# Patient Record
Sex: FEMALE | Race: WHITE | NOT HISPANIC OR LATINO | Employment: FULL TIME | URBAN - METROPOLITAN AREA
[De-identification: names, ages, dates, MRNs, and addresses within clinical notes are randomized per-mention and may not be internally consistent; named-entity substitution may affect disease eponyms.]

---

## 2017-10-16 ENCOUNTER — OFFICE VISIT (OUTPATIENT)
Dept: URGENT CARE | Facility: CLINIC | Age: 57
End: 2017-10-16
Payer: COMMERCIAL

## 2017-10-16 DIAGNOSIS — R30.0 DYSURIA: ICD-10-CM

## 2017-10-16 PROCEDURE — 99203 OFFICE O/P NEW LOW 30 MIN: CPT

## 2017-10-16 PROCEDURE — 81002 URINALYSIS NONAUTO W/O SCOPE: CPT

## 2017-10-17 ENCOUNTER — APPOINTMENT (OUTPATIENT)
Dept: LAB | Facility: HOSPITAL | Age: 57
End: 2017-10-17
Attending: NURSE PRACTITIONER
Payer: COMMERCIAL

## 2017-10-17 DIAGNOSIS — R30.0 DYSURIA: ICD-10-CM

## 2017-10-17 PROCEDURE — 87086 URINE CULTURE/COLONY COUNT: CPT

## 2017-10-18 LAB — BACTERIA UR CULT: ABNORMAL

## 2017-10-18 NOTE — PROGRESS NOTES
Assessment  1  Dysuria (788 1) (R30 0)    Plan   Dysuria    · Ciprofloxacin HCl - 500 MG Oral Tablet; Take 1 tablet twice daily   · Phenazopyridine HCl - 200 MG Oral Tablet; TAKE 1 TABLET 3 TIMES DAILY AFTER  MEALS AS NEEDED   · (1) URINE CULTURE; Source:Urine, Clean Catch; Status:Active; Requested  for:16Oct2017;     Urine Dip Non-Automated- POC; Status:Resulted - Requires Verification;   Done: 67UUN1779 12:00AM  Due:16Oct2018; Ordered; Ganesh Hsieh; Ordered By:Rena Nino;      Discussion/Summary  Discussion Summary:   Follow up with pcpin two days for results of urine culturemeds as discusseda lot of fluidsworsen go to ER, seek her ob/gyn or pcp  Medication Side Effects Reviewed: Possible side effects of new medications were reviewed with the patient/guardian today  Understands and agrees with treatment plan: The treatment plan was reviewed with the patient/guardian  The patient/guardian understands and agrees with the treatment plan   Counseling Documentation With Imm: The patient was counseled regarding instructions for management,-- patient and family education,-- importance of compliance with treatment  Follow Up Instructions: Follow Up with your Primary Care Provider in 1-2 days  If your symptoms worsen, go to the nearest [place] [address] Emergency Department  Chief Complaint  Chief Complaint Free Text Note Form: dysuria      History of Present Illness  HPI: Pain with urination, burning noted and for the past few days, and symptoms still present and used monistat last night  Took a 4 hour nap today which is not like pt and knows that something else may be going on  Review of Systems  Focused-Female:   Constitutional: No fever, no chills, feels well, no tiredness, no recent weight gain or loss  Gastrointestinal: no complaints of abdominal pain, no constipation, no nausea or diarrhea, no vomiting, no bloody stools  Genitourinary: as noted in HPI  Musculoskeletal: as noted in HPI     ROS Reviewed:   ROS reviewed  Allergies  1  Erythromycin TABS    Vitals  Signs   Recorded: [id number] 05:36PM   Temperature: 98 1 F  Heart Rate: 95  Respiration: 16  Systolic: 736  Diastolic: 66  Height: 5 ft 1 in  Weight: 163 lb   BMI Calculated: 30 8  BSA Calculated: 1 73  O2 Saturation: 98    Physical Exam    Constitutional   General appearance: No acute distress, well appearing and well nourished  Abdomen   Abdomen: Non-tender, no masses  Liver and spleen: No hepatomegaly or splenomegaly  Musculoskeletal   Gait and station: Normal     Psychiatric   Orientation to person, place, and time: Normal     Mood and affect: Normal     Additional Exam:   burning with urination, no blood noted, no discharge noted, no CVA tenderness  Signatures   Electronically signed by :  BUDDY Maria; Oct 16 2017  7:52PM EST                       (Author)    Electronically signed by : Panda Shea DO; Oct 17 2017 12:26PM EST                       (Co-author)

## 2017-12-11 ENCOUNTER — OFFICE VISIT (OUTPATIENT)
Dept: URGENT CARE | Facility: CLINIC | Age: 57
End: 2017-12-11
Payer: COMMERCIAL

## 2017-12-11 DIAGNOSIS — R30.0 DYSURIA: ICD-10-CM

## 2017-12-11 PROCEDURE — 99213 OFFICE O/P EST LOW 20 MIN: CPT

## 2017-12-11 PROCEDURE — 81002 URINALYSIS NONAUTO W/O SCOPE: CPT

## 2017-12-12 ENCOUNTER — APPOINTMENT (OUTPATIENT)
Dept: LAB | Facility: HOSPITAL | Age: 57
End: 2017-12-12
Attending: NURSE PRACTITIONER
Payer: COMMERCIAL

## 2017-12-12 DIAGNOSIS — R30.0 DYSURIA: ICD-10-CM

## 2017-12-12 PROCEDURE — 87086 URINE CULTURE/COLONY COUNT: CPT

## 2017-12-12 PROCEDURE — 87147 CULTURE TYPE IMMUNOLOGIC: CPT

## 2017-12-13 LAB
BILIRUB UR QL STRIP: NORMAL
CLARITY UR: NORMAL
COLOR UR: NORMAL
GLUCOSE (HISTORICAL): NORMAL
HGB UR QL STRIP.AUTO: NORMAL
KETONES UR STRIP-MCNC: NORMAL MG/DL
LEUKOCYTE ESTERASE UR QL STRIP: NORMAL
NITRITE UR QL STRIP: NORMAL
PH UR STRIP.AUTO: 5 [PH]
PROT UR STRIP-MCNC: NORMAL MG/DL
SP GR UR STRIP.AUTO: 1.01
UROBILINOGEN UR QL STRIP.AUTO: 0.5

## 2017-12-14 ENCOUNTER — GENERIC CONVERSION - ENCOUNTER (OUTPATIENT)
Dept: OTHER | Facility: OTHER | Age: 57
End: 2017-12-14

## 2017-12-14 LAB — BACTERIA UR CULT: NORMAL

## 2017-12-16 NOTE — PROGRESS NOTES
Assessment  1  Dysuria (788 1) (R30 0)    Plan   Dysuria    · Sulfamethoxazole-Trimethoprim 800-160 MG Oral Tablet; TAKE 1 TABLET TWICEDAILY UNTIL FINISHED   · Urine Dip Non-Automated- POC; Status:Complete;   Done: [id number] 05:00PM  (1) URINE CULTURE; Source:Urine, Clean Catch; Status:Resulted - Requires Verification;   Done: 10GAY9165 12:00AM Due:[id number];Ordered; Stat;   Mikala Paci; Ordered By:Lola Nino;    Discussion/Summary  Discussion Summary:   Follow up with pcptake meds as directedtake all meds, stay hydrated, drink plenty of waterdiscussed ER evaluationfollow up with urology if symptoms persistcall in two days for your urine culture results to see if treatment needs to be changed  eat yogurts, try taking probiotics  Medication Side Effects Reviewed: Possible side effects of new medications were reviewed with the patient/guardian today  Understands and agrees with treatment plan: The treatment plan was reviewed with the patient/guardian  The patient/guardian understands and agrees with the treatment plan   Counseling Documentation With Imm: The patient was counseled regarding instructions for management,-- patient and family education,-- importance of compliance with treatment  Follow Up Instructions: Follow Up with your Primary Care Provider in 1-2 days  If your symptoms worsen, go to the nearest [facility] Emergency Department  Chief Complaint  1  Urinary Frequency  Chief Complaint Free Text Note Form: Pt states she has a UTI  She has odor, burning on urination and dizziness  Has had this for a week or two  History of Present Illness  HPI: 57 yo female who is here today for dysuria for the past few days and worsening  She was here in October for similar complaints Wendy at that time her urine culture showed a large amount of lactobacilli  Patient has not followed up by her PCP OBGYN or urologist since her last visit   She denies having blood in urine, no nausea vomiting slight pain or fevers at this time   Hospital Based Practices Required Assessment:  Pain Assessment  the patient states they have pain  (on a scale of 0 to 10, the patient rates the pain at 1 )  Abuse And Domestic Violence Screen   Yes, the patient is safe at home  -- The patient states no one is hurting them  Depression And Suicide Screen  No, the patient has not had thoughts of hurting themself  No, the patient has not felt depressed in the past 7 days  Prefered Language is  [place]  Primary Language is  English  Urinary Frequency: Saritha Sandhu presents with complaints of urinary frequency  Review of Systems  Focused-Female:  Constitutional: as noted in HPI  Gastrointestinal: no complaints of abdominal pain, no constipation, no nausea or diarrhea, no vomiting, no bloody stools  Genitourinary: as noted in HPI  ROS Reviewed:   ROS reviewed  Active Problems  1  Dysuria (788 1) (R30 0)    Social History   · Caffeine use (V49 89) (F15 90)   · Never a smoker   · Social drinker (V49 89) (Z78 9)  Social History Reviewed: The social history was reviewed and updated today  The social history was reviewed and is unchanged  Current Meds   1  Elavil 25 MG Oral Tablet; Therapy: (Recorded:37Bst7880) to Recorded  Medication List Reviewed: The medication list was reviewed and updated today  Allergies  1  Erythromycin TABS  2  No Known Environmental Allergies   3  No Known Food Allergies    Vitals  Signs   Recorded: 87Vyx6782 06:19PM   Temperature: 97 6 F  Heart Rate: 90  Respiration: 16  Systolic: 142  Diastolic: 80  Height: 5 ft 1 in  Weight: 163 lb   BMI Calculated: 30 8  BSA Calculated: 1 73  O2 Saturation: 99  Pain Scale: 1    Physical Exam   Constitutional  General appearance: No acute distress, well appearing and well nourished  Abdomen  Abdomen: Non-tender, no masses  -- low suprapubic discomfort intermittent, burning with urination, strong odor to the urine    Musculoskeletal  Gait and station: Normal    Skin  Skin and subcutaneous tissue: Normal without rashes or lesions  Psychiatric  Orientation to person, place, and time: Normal    Mood and affect: Normal        Results/Data  Urine Dip Non-Automated- POC 82QTC4251 05:00PM Sia Buchanan     Test Name Result Flag Reference   Color Lorena     Clarity Transparent     Leukocytes lg     Nitrite neg     Blood trace     Bilirubin neg     Urobilinogen 0 5     Protein neg     Ph 5 0     Specific Gravity 1 015     Ketone neg     Glucose neg         Signatures   Electronically signed by :  BUDDY Negrete; Dec 13 2017 12:11PM EST                       (Author)    Electronically signed by : Hilton French DO; Dec 15 2017  2:59PM EST                       (Co-author)

## 2018-01-23 VITALS
HEIGHT: 61 IN | SYSTOLIC BLOOD PRESSURE: 148 MMHG | HEART RATE: 90 BPM | DIASTOLIC BLOOD PRESSURE: 80 MMHG | WEIGHT: 163 LBS | OXYGEN SATURATION: 99 % | BODY MASS INDEX: 30.78 KG/M2 | RESPIRATION RATE: 16 BRPM | TEMPERATURE: 97.6 F

## 2018-01-23 NOTE — RESULT NOTES
Verified Results  (1) URINE CULTURE 68Pia6271 12:17PM Anum Kannan Order Number: [id number]     Test Name Result Flag Reference   CLINICAL REPORT (Report)     Test:        Urine culture  Specimen Source:  Urine, Other  Specimen Type:   Urine  Specimen Date:   12/12/2017 12:17 PM  Result Date:    12/14/2017 8:04 AM  Result Status:   Final result  Resulting Lab:   [place] [address]            Tel: [phone]      CULTURE                                       ------------------                                   >100,000 cfu/ml      *** Mixed Contaminants X4 ***

## 2023-10-20 ENCOUNTER — OFFICE VISIT (OUTPATIENT)
Dept: URGENT CARE | Facility: CLINIC | Age: 63
End: 2023-10-20
Payer: COMMERCIAL

## 2023-10-20 VITALS
SYSTOLIC BLOOD PRESSURE: 122 MMHG | DIASTOLIC BLOOD PRESSURE: 78 MMHG | BODY MASS INDEX: 30.78 KG/M2 | HEART RATE: 93 BPM | OXYGEN SATURATION: 98 % | WEIGHT: 163 LBS | TEMPERATURE: 97.9 F | HEIGHT: 61 IN | RESPIRATION RATE: 16 BRPM

## 2023-10-20 DIAGNOSIS — J01.10 ACUTE NON-RECURRENT FRONTAL SINUSITIS: Primary | ICD-10-CM

## 2023-10-20 PROCEDURE — 99213 OFFICE O/P EST LOW 20 MIN: CPT | Performed by: FAMILY MEDICINE

## 2023-10-20 RX ORDER — AMOXICILLIN AND CLAVULANATE POTASSIUM 875; 125 MG/1; MG/1
1 TABLET, FILM COATED ORAL EVERY 12 HOURS SCHEDULED
Qty: 14 TABLET | Refills: 0 | Status: SHIPPED | OUTPATIENT
Start: 2023-10-20 | End: 2023-10-27

## 2023-10-20 RX ORDER — METHYLPREDNISOLONE 4 MG/1
TABLET ORAL
Qty: 21 TABLET | Refills: 0 | Status: SHIPPED | OUTPATIENT
Start: 2023-10-20

## 2023-10-20 RX ORDER — AMITRIPTYLINE HYDROCHLORIDE 50 MG/1
50 TABLET, FILM COATED ORAL
COMMUNITY

## 2023-10-20 NOTE — PROGRESS NOTES
[facility]        NAME: Radha Mckeon is a 66 y.o. female  : 1960    MRN: [id number]  DATE: 2023  TIME: 12:20 PM    Assessment and Plan   Acute non-recurrent frontal sinusitis [J01.10]  1. Acute non-recurrent frontal sinusitis  amoxicillin-clavulanate (AUGMENTIN) 875-125 mg per tablet    methylPREDNISolone 4 MG tablet therapy pack            Patient Instructions       Follow up with PCP in 3-5 days. Proceed to  ER if symptoms worsen. Chief Complaint     Chief Complaint   Patient presents with    Cold Like Symptoms     Pt reports sinus congestion and pressure with post nasal drip. States onset of symptoms three weeks ago. C/o minor productive cough with yellow/green mucus. Managing symptoms with DayQuil and Tylenol without much relief. History of Present Illness       47-year-old female with 3-week history of increased nasal congestion, sinus pressure, cough and congestion. Denies any fevers or chills. Review of Systems   Review of Systems   Constitutional: Negative. HENT:  Positive for congestion and postnasal drip. Eyes: Negative. Respiratory: Negative. Cardiovascular: Negative. Gastrointestinal: Negative. Genitourinary: Negative. Skin: Negative. Allergic/Immunologic: Negative. Neurological: Negative. Hematological: Negative. Psychiatric/Behavioral: Negative.            Current Medications       Current Outpatient Medications:     amitriptyline (ELAVIL) 50 mg tablet, Take 50 mg by mouth daily at bedtime, Disp: , Rfl:     amoxicillin-clavulanate (AUGMENTIN) 875-125 mg per tablet, Take 1 tablet by mouth every 12 (twelve) hours for 7 days, Disp: 14 tablet, Rfl: 0    methylPREDNISolone 4 MG tablet therapy pack, Use as directed on package, Disp: 21 tablet, Rfl: 0    Current Allergies     Allergies as of 10/20/2023 - Reviewed 10/20/2023   Allergen Reaction Noted    Erythromycin GI Intolerance 10/16/2017            The following portions of the patient's history were reviewed and updated as appropriate: allergies, current medications, past family history, past medical history, past social history, past surgical history and problem list.     No past medical history on file. No past surgical history on file. No family history on file. Medications have been verified. Objective   /78 (BP Location: Right arm, Patient Position: Sitting)   Pulse 93   Temp 97.9 °F (36.6 °C)   Resp 16   Ht 5' 1" (1.549 m)   Wt 73.9 kg (163 lb)   SpO2 98%   BMI 30.80 kg/m²   No LMP recorded. Physical Exam     Physical Exam  Vitals and nursing note reviewed. Constitutional:       Appearance: She is well-developed. HENT:      Head: Normocephalic. Nose: Congestion present. Eyes:      Pupils: Pupils are equal, round, and reactive to light. Cardiovascular:      Rate and Rhythm: Normal rate and regular rhythm. Pulmonary:      Effort: Pulmonary effort is normal.   Abdominal:      General: Abdomen is flat. Musculoskeletal:         General: Normal range of motion. Cervical back: Normal range of motion. Skin:     General: Skin is warm and dry. Neurological:      Mental Status: She is alert and oriented to person, place, and time.

## 2023-12-19 PROBLEM — J01.10 ACUTE NON-RECURRENT FRONTAL SINUSITIS: Status: RESOLVED | Noted: 2023-10-20 | Resolved: 2023-12-19

## 2024-01-23 ENCOUNTER — OFFICE VISIT (OUTPATIENT)
Dept: FAMILY MEDICINE CLINIC | Facility: HOSPITAL | Age: 64
End: 2024-01-23
Payer: COMMERCIAL

## 2024-01-23 ENCOUNTER — APPOINTMENT (OUTPATIENT)
Dept: LAB | Facility: HOSPITAL | Age: 64
End: 2024-01-23
Payer: COMMERCIAL

## 2024-01-23 VITALS
BODY MASS INDEX: 30.21 KG/M2 | HEART RATE: 73 BPM | WEIGHT: 160 LBS | HEIGHT: 61 IN | DIASTOLIC BLOOD PRESSURE: 84 MMHG | OXYGEN SATURATION: 97 % | SYSTOLIC BLOOD PRESSURE: 138 MMHG

## 2024-01-23 DIAGNOSIS — Z13.1 SCREENING FOR DIABETES MELLITUS (DM): ICD-10-CM

## 2024-01-23 DIAGNOSIS — M79.7 FIBROMYALGIA: ICD-10-CM

## 2024-01-23 DIAGNOSIS — Z13.220 SCREENING CHOLESTEROL LEVEL: ICD-10-CM

## 2024-01-23 DIAGNOSIS — Z13.29 SCREENING FOR THYROID DISORDER: ICD-10-CM

## 2024-01-23 DIAGNOSIS — Z76.89 ESTABLISHING CARE WITH NEW DOCTOR, ENCOUNTER FOR: Primary | ICD-10-CM

## 2024-01-23 DIAGNOSIS — Z12.31 SCREENING MAMMOGRAM FOR BREAST CANCER: ICD-10-CM

## 2024-01-23 DIAGNOSIS — Z11.59 ENCOUNTER FOR HEPATITIS C SCREENING TEST FOR LOW RISK PATIENT: ICD-10-CM

## 2024-01-23 DIAGNOSIS — Z11.4 SCREENING FOR HIV (HUMAN IMMUNODEFICIENCY VIRUS): ICD-10-CM

## 2024-01-23 DIAGNOSIS — Z12.11 SCREENING FOR COLON CANCER: ICD-10-CM

## 2024-01-23 DIAGNOSIS — Z13.0 SCREENING, ANEMIA, DEFICIENCY, IRON: ICD-10-CM

## 2024-01-23 LAB
ALBUMIN SERPL BCP-MCNC: 4.8 G/DL (ref 3.5–5)
ALP SERPL-CCNC: 56 U/L (ref 34–104)
ALT SERPL W P-5'-P-CCNC: 21 U/L (ref 7–52)
ANION GAP SERPL CALCULATED.3IONS-SCNC: 9 MMOL/L
AST SERPL W P-5'-P-CCNC: 17 U/L (ref 13–39)
BASOPHILS # BLD AUTO: 0.03 THOUSANDS/ÂΜL (ref 0–0.1)
BASOPHILS NFR BLD AUTO: 1 % (ref 0–1)
BILIRUB SERPL-MCNC: 0.61 MG/DL (ref 0.2–1)
BUN SERPL-MCNC: 14 MG/DL (ref 5–25)
CALCIUM SERPL-MCNC: 9.9 MG/DL (ref 8.4–10.2)
CHLORIDE SERPL-SCNC: 103 MMOL/L (ref 96–108)
CHOLEST SERPL-MCNC: 233 MG/DL
CO2 SERPL-SCNC: 28 MMOL/L (ref 21–32)
CREAT SERPL-MCNC: 0.66 MG/DL (ref 0.6–1.3)
EOSINOPHIL # BLD AUTO: 0.17 THOUSAND/ÂΜL (ref 0–0.61)
EOSINOPHIL NFR BLD AUTO: 4 % (ref 0–6)
ERYTHROCYTE [DISTWIDTH] IN BLOOD BY AUTOMATED COUNT: 12.4 % (ref 11.6–15.1)
GFR SERPL CREATININE-BSD FRML MDRD: 95 ML/MIN/1.73SQ M
GLUCOSE P FAST SERPL-MCNC: 89 MG/DL (ref 65–99)
HCT VFR BLD AUTO: 43.1 % (ref 34.8–46.1)
HCV AB SER QL: NORMAL
HDLC SERPL-MCNC: 91 MG/DL
HGB BLD-MCNC: 14 G/DL (ref 11.5–15.4)
IMM GRANULOCYTES # BLD AUTO: 0.01 THOUSAND/UL (ref 0–0.2)
IMM GRANULOCYTES NFR BLD AUTO: 0 % (ref 0–2)
LDLC SERPL CALC-MCNC: 123 MG/DL (ref 0–100)
LYMPHOCYTES # BLD AUTO: 1.08 THOUSANDS/ÂΜL (ref 0.6–4.47)
LYMPHOCYTES NFR BLD AUTO: 26 % (ref 14–44)
MCH RBC QN AUTO: 31.2 PG (ref 26.8–34.3)
MCHC RBC AUTO-ENTMCNC: 32.5 G/DL (ref 31.4–37.4)
MCV RBC AUTO: 96 FL (ref 82–98)
MONOCYTES # BLD AUTO: 0.28 THOUSAND/ÂΜL (ref 0.17–1.22)
MONOCYTES NFR BLD AUTO: 7 % (ref 4–12)
NEUTROPHILS # BLD AUTO: 2.6 THOUSANDS/ÂΜL (ref 1.85–7.62)
NEUTS SEG NFR BLD AUTO: 62 % (ref 43–75)
NRBC BLD AUTO-RTO: 0 /100 WBCS
PLATELET # BLD AUTO: 277 THOUSANDS/UL (ref 149–390)
PMV BLD AUTO: 10.4 FL (ref 8.9–12.7)
POTASSIUM SERPL-SCNC: 4.2 MMOL/L (ref 3.5–5.3)
PROT SERPL-MCNC: 7.5 G/DL (ref 6.4–8.4)
RBC # BLD AUTO: 4.49 MILLION/UL (ref 3.81–5.12)
SODIUM SERPL-SCNC: 140 MMOL/L (ref 135–147)
T4 FREE SERPL-MCNC: 0.93 NG/DL (ref 0.61–1.12)
TRIGL SERPL-MCNC: 94 MG/DL
TSH SERPL DL<=0.05 MIU/L-ACNC: 1.5 UIU/ML (ref 0.45–4.5)
WBC # BLD AUTO: 4.17 THOUSAND/UL (ref 4.31–10.16)

## 2024-01-23 PROCEDURE — 80061 LIPID PANEL: CPT

## 2024-01-23 PROCEDURE — 84439 ASSAY OF FREE THYROXINE: CPT

## 2024-01-23 PROCEDURE — 99204 OFFICE O/P NEW MOD 45 MIN: CPT | Performed by: STUDENT IN AN ORGANIZED HEALTH CARE EDUCATION/TRAINING PROGRAM

## 2024-01-23 PROCEDURE — 84443 ASSAY THYROID STIM HORMONE: CPT

## 2024-01-23 PROCEDURE — 86803 HEPATITIS C AB TEST: CPT

## 2024-01-23 PROCEDURE — 36415 COLL VENOUS BLD VENIPUNCTURE: CPT

## 2024-01-23 PROCEDURE — 80053 COMPREHEN METABOLIC PANEL: CPT

## 2024-01-23 PROCEDURE — 87536 HIV-1 QUANT&REVRSE TRNSCRPJ: CPT

## 2024-01-23 PROCEDURE — 85025 COMPLETE CBC W/AUTO DIFF WBC: CPT

## 2024-01-23 RX ORDER — AMITRIPTYLINE HYDROCHLORIDE 50 MG/1
50 TABLET, FILM COATED ORAL
Qty: 90 TABLET | Refills: 3 | Status: SHIPPED | OUTPATIENT
Start: 2024-01-23

## 2024-01-23 NOTE — PROGRESS NOTES
[facility]   Gilda Menon DO    Assessment/Plan:      Diagnosis ICD-10-CM Associated Orders   1. Establishing care with new doctor, encounter for  Z76.89       2. Screening mammogram for breast cancer  Z12.31 Mammo screening bilateral w 3d & cad      3. Screening for colon cancer  Z12.11 Cologuard      4. Screening, anemia, deficiency, iron  Z13.0 CBC and differential      5. Screening for diabetes mellitus (DM)  Z13.1 Comprehensive metabolic panel      6. Screening cholesterol level  Z13.220 Lipid Panel with Direct LDL reflex      7. Screening for thyroid disorder  Z13.29 TSH, 3rd generation     T4, free      8. Encounter for hepatitis C screening test for low risk patient  Z11.59 Hepatitis C antibody      9. Screening for HIV (human immunodeficiency virus)  Z11.4 HIV-1 RNA, Quantitative PCR, [facility]      10. Fibromyalgia  M79.7 amitriptyline (ELAVIL) 50 mg tablet        Mammo - ordered. Cologuard ordered.   Screening & diagnostic bloodwork ordered, our office will reach out with results once obtained.   Reordered chronic med - elavil.   Return in about 2 months (around 3/23/2024) for Annual Physical.  Patient may call or return to office with any questions or concerns.   ______________________________________________________________________  Subjective:     Patient ID: Nilda Rosas is a 63 y.o. female.  HPI  Nilda Rosas  Chief Complaint   Patient presents with    Establish Care    Sinus Problem     Post nasal drip      C/S 31 yrs ago, and had horrible pain for 6 months, given dx of fibromylagia, on elavil for yrs. Son is 31. Daughter 25.     Chronic PND, sinus always bad.   Upsets stomach.     Mom had Crohn's  in .   Dad  in  of CHF.     Gets anxiety & some occas chest palpitations.      The following portions of the patient's history were reviewed and updated as appropriate: allergies, current medications, past medical history, and problem list.    Review of Systems  "  Constitutional:  Negative for chills and fever.   HENT:  Positive for postnasal drip and rhinorrhea (slight in am). Negative for sore throat.    Respiratory:  Negative for cough and shortness of breath.    Cardiovascular:  Positive for palpitations. Negative for chest pain.   Neurological:  Negative for dizziness, light-headedness and headaches.       Objective:      Vitals:    01/23/24 0732   BP: 138/84   Pulse: 73   SpO2: 97%      Physical Exam  Vitals and nursing note reviewed.   Constitutional:       General: She is not in acute distress.     Appearance: Normal appearance. She is not ill-appearing.   HENT:      Head: Normocephalic and atraumatic.   Eyes:      General: No scleral icterus.        Right eye: No discharge.         Left eye: No discharge.   Cardiovascular:      Rate and Rhythm: Normal rate and regular rhythm.      Pulses: Normal pulses.      Heart sounds: Normal heart sounds. No murmur heard.  Pulmonary:      Effort: Pulmonary effort is normal. No respiratory distress.      Breath sounds: Normal breath sounds. No stridor. No wheezing.   Musculoskeletal:      Cervical back: Normal range of motion and neck supple. No rigidity or tenderness.      Right lower leg: No edema.      Left lower leg: No edema.   Lymphadenopathy:      Cervical: No cervical adenopathy.   Skin:     Comments: Well healed surgical scar on R neck.    Neurological:      Mental Status: She is alert and oriented to person, place, and time.      Gait: Gait normal.   Psychiatric:         Mood and Affect: Mood normal.         Behavior: Behavior normal.         Thought Content: Thought content normal.         Judgment: Judgment normal.         Portions of the record may have been created with voice recognition software. Occasional wrong word or \"sound alike\" substitutions may have occurred due to the inherent limitations of voice recognition software. Please review the chart carefully and recognize, using context, where " substitutions/typographical errors may have occurred.

## 2024-01-24 LAB — HIV1 RNA # PLAS NAA DL=20: NOT DETECTED {COPIES}/ML

## 2024-02-08 LAB — COLOGUARD RESULT REPORTABLE: NEGATIVE

## 2024-03-27 ENCOUNTER — OFFICE VISIT (OUTPATIENT)
Dept: FAMILY MEDICINE CLINIC | Facility: HOSPITAL | Age: 64
End: 2024-03-27
Payer: COMMERCIAL

## 2024-03-27 VITALS
HEIGHT: 61 IN | DIASTOLIC BLOOD PRESSURE: 78 MMHG | WEIGHT: 163 LBS | OXYGEN SATURATION: 98 % | SYSTOLIC BLOOD PRESSURE: 132 MMHG | BODY MASS INDEX: 30.78 KG/M2 | HEART RATE: 94 BPM

## 2024-03-27 DIAGNOSIS — Z00.00 ROUTINE ADULT HEALTH MAINTENANCE: Primary | ICD-10-CM

## 2024-03-27 DIAGNOSIS — F43.9 SITUATIONAL STRESS: ICD-10-CM

## 2024-03-27 DIAGNOSIS — M79.7 FIBROMYALGIA: ICD-10-CM

## 2024-03-27 DIAGNOSIS — E78.00 ELEVATED CHOLESTEROL: ICD-10-CM

## 2024-03-27 PROCEDURE — 99396 PREV VISIT EST AGE 40-64: CPT | Performed by: STUDENT IN AN ORGANIZED HEALTH CARE EDUCATION/TRAINING PROGRAM

## 2024-03-27 PROCEDURE — 99214 OFFICE O/P EST MOD 30 MIN: CPT | Performed by: STUDENT IN AN ORGANIZED HEALTH CARE EDUCATION/TRAINING PROGRAM

## 2024-03-27 NOTE — PATIENT INSTRUCTIONS
Coronary CT Calcium - Score - [facility] $99     Psychology Services:     Please call the Behavioral Health Line on the back of your insurance card for information on in network providers.    Suicide Prevention Line : DIAL 988       https://[email or web address].[email or web address]/      You can also benefit from searching by topic or location through:         [email or web address]/NOMERMAIL.RU   OR    https://findtreatment.gov/  OR   https://www.[facility].gov/find-help    [facility] - [phone]  [facility] - [phone]  [facility] - [phone]  [facility] - [phone]  [facility] - [phone]  ** [place]   [facility] - [phone]  [facility] - [phone]  [facility] - [phone]  [facility] - [phone]    B12 - 100-1000mcg per day. Gummy    Vit D3 - 8475-1511 IU per day   - mood & energy & illness  Calcium - 400-1200 mg a day   Magnesium Ox / Gluconate  400 mg a day. - headaches     Womens MVI > 50 extra Ca2+     Osteopenia diet:   To get enough calcium and vitamin D, eat dairy products such as cheese, milk, and yogurt. Some types of orange juice, breads, and cereals are fortified with calcium and vitamin D. Other foods with calcium include:    dried beans  broccoli  wild freshwater salmon  Spinach    The goal for people with osteoporosis is 1,200 mg of calcium a day and 3000 international units (IU) of vitamin D.    - Per Healthline Website

## 2024-03-27 NOTE — PROGRESS NOTES
ADULT ANNUAL PHYSICAL  [facility] PRIMARY CARE [address]    NAME: Nilda Rosas  AGE: 64 y.o. SEX: female  : 1960      Assessment and Plan:      Diagnosis ICD-10-CM Associated Orders   1. Routine adult health maintenance  Z00.00       2. Fibromyalgia  M79.7       3. Elevated cholesterol  E78.00       4. Situational stress  F43.9         Coronary CT Calcium - Score - [facility] $99     Psychiatry behavioral health/therapy resources provided to her today, discussed several stressors in her life including her move to [place].  She has been episodes of anxiety, and even fearfulness while driving that is new for her.  I do recommend that she start with a therapist, or find online options/helpful videos/apps like calm.    Abdominal symptoms likely related to stress.    Would recommend a tetanus booster for her as it is likely > 10 years.    Immunizations and preventive care screenings were discussed with patient today. Appropriate education was printed on patient's after visit summary.    Counseling:  Alcohol/drug use: discussed moderation in alcohol intake, the recommendations for healthy alcohol use, and avoidance of illicit drug use.  Dental Health: discussed importance of regular tooth brushing, flossing, and dental visits.  Injury prevention: discussed safety/seat belts, safety helmets, smoke detectors, carbon dioxide detectors, and smoking near bedding or upholstery.  Sexual health: discussed sexually transmitted diseases, partner selection, use of condoms, avoidance of unintended pregnancy, and contraceptive alternatives.  Exercise: the importance of regular exercise/physical activity was discussed. Recommend exercise 3-5 times per week for at least 30 minutes.      Return in about 1 year (around 3/27/2025) for Annual Physical.     Chief Complaint:     Chief Complaint   Patient presents with   • Physical Exam      History of Present Illness:     Adult  Annual Physical   Patient here for a comprehensive physical exam.   The patient reports problems - anxiety & bloating at times, gassy upset stomach at times .    Wt Readings from Last 3 Encounters:   04/05/24 72.6 kg (160 lb)   03/27/24 73.9 kg (163 lb)   01/23/24 72.6 kg (160 lb)     Temp Readings from Last 3 Encounters:   10/20/23 97.9 °F (36.6 °C)   12/11/17 97.6 °F (36.4 °C)     BP Readings from Last 3 Encounters:   03/27/24 132/78   01/23/24 138/84   10/20/23 122/78     Pulse Readings from Last 3 Encounters:   03/27/24 94   01/23/24 73   10/20/23 93     Diet and Physical Activity  Diet/Nutrition: sometimes gut issues, and watches what she eats.   Exercise: walking and moving home now .   No Drug use.   Tobacco use:  reports that she has never smoked. She has never used smokeless tobacco.  Alcohol use - some wine     Depression Screening  PHQ-2/9 Depression Screening    Little interest or pleasure in doing things: 0 - not at all  Feeling down, depressed, or hopeless: 0 - not at all     Does have anxiety - some chest pain, or quick flip of HR.   Stress over moving.   Struggled with driving in bad weather the other day.   Not seeing therapist at this time.   Hoping to be moved by 6/1/24.     General Health  Sleep:  hard to fall to sleep due to anxiety at times .   Hearing: normal - bilateral, ringing  Vision: goes for regular eye exams and wears glasses.   Dental: regular dental visits and brushes teeth twice daily.       /GYN Health  Follows with gynecology? no   Patient is: postmenopausal, no prior hysterectomy, just tubal ligation     Review of Systems:     Review of Systems   Constitutional:  Negative for chills and fever.   Eyes:  Negative for photophobia and visual disturbance.   Respiratory:  Negative for cough and shortness of breath.    Cardiovascular:  Negative for chest pain and palpitations.        With some anxiety or a quick catch    Gastrointestinal:  Positive for abdominal distention. Negative  for constipation and diarrhea.   Genitourinary:  Negative for dysuria and urgency.   Neurological:  Negative for dizziness, light-headedness and headaches.      Past Medical History:     Past Medical History:   Diagnosis Date   • Allergic     Post nasal drip      Past Surgical History:     Past Surgical History:   Procedure Laterality Date   •  SECTION      Two c-sections for both my children   • NECK MASS EXCISION Right     BENIGN CYST done by [facility] ENT/Surgeon   • TUBAL LIGATION      Last child 1997      Social History:     Social History     Socioeconomic History   • Marital status: /Civil Union     Spouse name: None   • Number of children: None   • Years of education: None   • Highest education level: None   Occupational History   • None   Tobacco Use   • Smoking status: Never   • Smokeless tobacco: Never   Vaping Use   • Vaping status: Never Used   Substance and Sexual Activity   • Alcohol use: Yes     Alcohol/week: 4.0 standard drinks of alcohol     Types: 4 Glasses of wine per week     Comment: Socially   • Drug use: Never   • Sexual activity: Yes     Partners: Male   Other Topics Concern   • None   Social History Narrative   • None     Social Determinants of Health     Financial Resource Strain: Not on file   Food Insecurity: Not on file   Transportation Needs: Not on file   Physical Activity: Not on file   Stress: Not on file   Social Connections: Not on file   Intimate Partner Violence: Not on file   Housing Stability: Not on file      Family History:     Family History   Problem Relation Age of Onset   • Crohn's disease Mother    • Heart disease Father          of congestive heart failure   • No Known Problems Daughter    • Cancer Maternal Grandmother         Unknown origin and age   • No Known Problems Maternal Grandfather    • No Known Problems Paternal Grandmother    • No Known Problems Paternal Grandfather    • No Known Problems Paternal Aunt    • No Known Problems Maternal  "Aunt    • No Known Problems Maternal Aunt       Current Medications:     Current Outpatient Medications   Medication Sig Dispense Refill   • amitriptyline (ELAVIL) 50 mg tablet Take 1 tablet (50 mg total) by mouth daily at bedtime 90 tablet 3     No current facility-administered medications for this visit.      Allergies:     Allergies   Allergen Reactions   • Erythromycin GI Intolerance      Physical Exam:     /78   Pulse 94   Ht 5' 1\" (1.549 m)   Wt 73.9 kg (163 lb)   SpO2 98%   BMI 30.80 kg/m²     Physical Exam  Vitals reviewed.   Constitutional:       General: She is not in acute distress.     Appearance: Normal appearance. She is not ill-appearing.      Comments: Overweight   HENT:      Head: Normocephalic and atraumatic.      Right Ear: Tympanic membrane, ear canal and external ear normal. There is no impacted cerumen.      Left Ear: Tympanic membrane, ear canal and external ear normal. There is no impacted cerumen.      Nose: Nose normal. No congestion or rhinorrhea.      Mouth/Throat:      Mouth: Mucous membranes are moist.      Pharynx: Oropharynx is clear. No oropharyngeal exudate or posterior oropharyngeal erythema.   Eyes:      General: No scleral icterus.        Right eye: No discharge.         Left eye: No discharge.      Conjunctiva/sclera: Conjunctivae normal.   Neck:      Comments: No thyroid nodules or thyromegaly.  Cardiovascular:      Rate and Rhythm: Normal rate and regular rhythm.      Pulses: Normal pulses.      Heart sounds: Normal heart sounds. No murmur heard.     No friction rub. No gallop.   Pulmonary:      Effort: Pulmonary effort is normal. No respiratory distress.      Breath sounds: Normal breath sounds. No stridor. No wheezing.   Musculoskeletal:         General: No swelling or tenderness. Normal range of motion.      Cervical back: Normal range of motion and neck supple. No rigidity.      Right lower leg: No edema.      Left lower leg: No edema.   Lymphadenopathy:      " Cervical: No cervical adenopathy.   Skin:     General: Skin is warm and dry.      Capillary Refill: Capillary refill takes less than 2 seconds.      Coloration: Skin is not jaundiced or pale.   Neurological:      Mental Status: She is alert and oriented to person, place, and time.      Gait: Gait normal.   Psychiatric:         Mood and Affect: Mood normal.         Behavior: Behavior normal.         Thought Content: Thought content normal.         Judgment: Judgment normal.          Gilda Menon DO  [facility] PRIMARY [facility]ARE [address]

## 2024-04-05 ENCOUNTER — HOSPITAL ENCOUNTER (OUTPATIENT)
Dept: MAMMOGRAPHY | Facility: IMAGING CENTER | Age: 64
Discharge: HOME | End: 2024-04-05
Payer: COMMERCIAL

## 2024-04-05 VITALS — BODY MASS INDEX: 30.21 KG/M2 | WEIGHT: 160 LBS | HEIGHT: 61 IN

## 2024-04-05 DIAGNOSIS — Z12.31 SCREENING MAMMOGRAM FOR BREAST CANCER: ICD-10-CM

## 2024-04-05 PROCEDURE — 77067 SCR MAMMO BI INCL CAD: CPT

## 2024-04-05 PROCEDURE — 77063 BREAST TOMOSYNTHESIS BI: CPT
